# Patient Record
Sex: MALE
[De-identification: names, ages, dates, MRNs, and addresses within clinical notes are randomized per-mention and may not be internally consistent; named-entity substitution may affect disease eponyms.]

---

## 2021-07-26 ENCOUNTER — TRANSCRIPTION ENCOUNTER (OUTPATIENT)
Age: 21
End: 2021-07-26

## 2025-02-09 ENCOUNTER — OFFICE VISIT (OUTPATIENT)
Dept: URGENT CARE | Facility: CLINIC | Age: 25
End: 2025-02-09
Payer: COMMERCIAL

## 2025-02-09 VITALS
HEART RATE: 100 BPM | OXYGEN SATURATION: 99 % | DIASTOLIC BLOOD PRESSURE: 75 MMHG | TEMPERATURE: 99.2 F | RESPIRATION RATE: 12 BRPM | SYSTOLIC BLOOD PRESSURE: 120 MMHG | WEIGHT: 280 LBS

## 2025-02-09 DIAGNOSIS — K29.00 OTHER ACUTE GASTRITIS WITHOUT HEMORRHAGE: Primary | ICD-10-CM

## 2025-02-09 PROBLEM — J45.909 ASTHMA: Status: ACTIVE | Noted: 2022-06-01

## 2025-02-09 PROCEDURE — G0382 LEV 3 HOSP TYPE B ED VISIT: HCPCS | Performed by: NURSE PRACTITIONER

## 2025-02-09 RX ORDER — ONDANSETRON 8 MG/1
8 TABLET, FILM COATED ORAL EVERY 8 HOURS PRN
Qty: 20 TABLET | Refills: 0 | Status: SHIPPED | OUTPATIENT
Start: 2025-02-09

## 2025-02-09 RX ORDER — DICYCLOMINE HCL 20 MG
20 TABLET ORAL EVERY 6 HOURS
Qty: 20 TABLET | Refills: 0 | Status: SHIPPED | OUTPATIENT
Start: 2025-02-09 | End: 2025-02-14

## 2025-02-09 RX ORDER — ALBUTEROL SULFATE 90 UG/1
2 INHALANT RESPIRATORY (INHALATION) EVERY 6 HOURS PRN
COMMUNITY

## 2025-02-09 RX ORDER — ONDANSETRON 4 MG/1
8 TABLET, ORALLY DISINTEGRATING ORAL ONCE
Status: COMPLETED | OUTPATIENT
Start: 2025-02-09 | End: 2025-02-09

## 2025-02-09 RX ADMIN — ONDANSETRON 8 MG: 4 TABLET, ORALLY DISINTEGRATING ORAL at 14:53

## 2025-02-09 NOTE — LETTER
February 9, 2025     Patient: Sujit Craven   YOB: 2000   Date of Visit: 2/9/2025       To Whom it May Concern:    Sujit Craven was seen in my clinic on 2/9/2025. He may return to gym class or sports on 02/12/2025 .    If you have any questions or concerns, please don't hesitate to call.         Sincerely,          ENMANUEL Rubin        CC: No Recipients

## 2025-02-09 NOTE — PROGRESS NOTES
[facility]        NAME: Sujit Cravne is a 24 y.o. male  : 2000    MRN: [id number]  DATE: 2025  TIME: 2:54 PM    Assessment and Plan   Other acute gastritis without hemorrhage [K29.00]  1. Other acute gastritis without hemorrhage  ondansetron (ZOFRAN-ODT) dispersible tablet 8 mg    ondansetron (ZOFRAN) 8 mg tablet    dicyclomine (BENTYL) 20 mg tablet        Zofran given in office, advised can use zofran and bentyl prn as directed. Rest and hydrate. Increase diet as tolerated. Notes provided for school and foot ball.     Patient Instructions       Follow up with PCP in 3-5 days.  Proceed to  ER if symptoms worsen.    If tests are performed, our office will contact you with results only if changes need to made to the care plan discussed with you at the visit. You can review your full results on [facility].    Chief Complaint     Chief Complaint   Patient presents with    Vomiting     With diarrhea over night. Feeling nausea. And cold         History of Present Illness       States he ate out last night but girlfriend at the same foods and has no complaints so does not think it is food related.     Vomiting   This is a new problem. The current episode started today. The problem occurs less than 2 times per day. The problem has been unchanged. The emesis has an appearance of stomach contents. There has been no fever. Associated symptoms include abdominal pain, chills, diarrhea and sweats. Pertinent negatives include no arthralgias, chest pain, coughing, dizziness, fever, headaches, myalgias, URI or weight loss. He has tried nothing for the symptoms. The treatment provided no relief.       Review of Systems   Review of Systems   Constitutional:  Positive for chills. Negative for fever and weight loss.   Respiratory:  Negative for cough.    Cardiovascular:  Negative for chest pain.   Gastrointestinal:  Positive for abdominal pain, diarrhea, nausea and vomiting.   Musculoskeletal:   Positive for back pain. Negative for arthralgias and myalgias.   Neurological:  Negative for dizziness and headaches.         Current Medications       Current Outpatient Medications:     albuterol (PROVENTIL HFA,VENTOLIN HFA) 90 mcg/act inhaler, Inhale 2 puffs every 6 (six) hours as needed for wheezing, Disp: , Rfl:     dicyclomine (BENTYL) 20 mg tablet, Take 1 tablet (20 mg total) by mouth every 6 (six) hours for 5 days, Disp: 20 tablet, Rfl: 0    ondansetron (ZOFRAN) 8 mg tablet, Take 1 tablet (8 mg total) by mouth every 8 (eight) hours as needed for nausea or vomiting, Disp: 20 tablet, Rfl: 0  No current facility-administered medications for this visit.    Current Allergies     Allergies as of 02/09/2025    (No Known Allergies)            The following portions of the patient's history were reviewed and updated as appropriate: allergies, current medications, past family history, past medical history, past social history, past surgical history and problem list.     No past medical history on file.    No past surgical history on file.    No family history on file.      Medications have been verified.        Objective   /75   Pulse 100   Temp 99.2 °F (37.3 °C)   Resp 12   Wt 127 kg (280 lb)   SpO2 99%        Physical Exam     Physical Exam  Vitals and nursing note reviewed.   Constitutional:       General: He is not in acute distress.     Appearance: Normal appearance. He is ill-appearing.   HENT:      Head: Normocephalic and atraumatic.   Cardiovascular:      Rate and Rhythm: Normal rate and regular rhythm.      Heart sounds: Normal heart sounds, S1 normal and S2 normal.   Pulmonary:      Effort: Pulmonary effort is normal. No accessory muscle usage.      Breath sounds: Normal breath sounds. No wheezing.   Abdominal:      General: Abdomen is flat. Bowel sounds are normal. There is no distension.      Palpations: Abdomen is soft. There is no mass.      Tenderness: There is no guarding or rebound.    Musculoskeletal:      Thoracic back: Tenderness present.   Skin:     General: Skin is warm and dry.      Capillary Refill: Capillary refill takes less than 2 seconds.      Findings: No rash.   Neurological:      General: No focal deficit present.      Mental Status: He is alert and oriented to person, place, and time.      Motor: Motor function is intact.   Psychiatric:         Attention and Perception: Attention and perception normal.         Mood and Affect: Mood and affect normal.         Speech: Speech normal.         Behavior: Behavior normal. Behavior is cooperative.         Thought Content: Thought content normal.

## 2025-02-09 NOTE — LETTER
February 9, 2025     Patient: Sujit Craven   YOB: 2000   Date of Visit: 2/9/2025       To Whom it May Concern:    Sujit Craven was seen in my clinic on 2/9/2025. He may return to school on 02/12/2025 .    If you have any questions or concerns, please don't hesitate to call.         Sincerely,          ENMANUEL Rubin        CC: No Recipients